# Patient Record
Sex: FEMALE | Race: WHITE
[De-identification: names, ages, dates, MRNs, and addresses within clinical notes are randomized per-mention and may not be internally consistent; named-entity substitution may affect disease eponyms.]

---

## 2019-11-30 ENCOUNTER — HOSPITAL ENCOUNTER (EMERGENCY)
Dept: HOSPITAL 4 - SED | Age: 24
Discharge: HOME | End: 2019-11-30
Payer: MEDICAID

## 2019-11-30 VITALS — SYSTOLIC BLOOD PRESSURE: 124 MMHG

## 2019-11-30 VITALS — BODY MASS INDEX: 31.89 KG/M2 | HEIGHT: 63 IN | WEIGHT: 180 LBS

## 2019-11-30 VITALS — SYSTOLIC BLOOD PRESSURE: 125 MMHG
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
## 2019-11-30 DIAGNOSIS — T49.8X5A: ICD-10-CM

## 2019-11-30 DIAGNOSIS — R22.0: Primary | ICD-10-CM

## 2021-01-06 ENCOUNTER — HOSPITAL ENCOUNTER (INPATIENT)
Dept: HOSPITAL 4 - SED | Age: 26
LOS: 1 days | Discharge: HOME | DRG: 566 | End: 2021-01-07
Payer: MEDICAID

## 2021-01-06 VITALS — BODY MASS INDEX: 32.43 KG/M2 | HEIGHT: 63 IN | WEIGHT: 183 LBS

## 2021-01-06 VITALS — SYSTOLIC BLOOD PRESSURE: 137 MMHG

## 2021-01-06 DIAGNOSIS — Z20.822: ICD-10-CM

## 2021-01-06 DIAGNOSIS — O00.90: Primary | ICD-10-CM

## 2021-01-07 VITALS — SYSTOLIC BLOOD PRESSURE: 98 MMHG

## 2021-01-07 LAB
ANION GAP SERPL CALCULATED.3IONS-SCNC: 12 MMOL/L (ref 5–15)
APPEARANCE UR: (no result)
BACTERIA URNS QL MICRO: (no result) /HPF
BASOPHILS # BLD AUTO: 0.1 K/UL (ref 0–0.2)
BASOPHILS NFR BLD AUTO: 1 % (ref 0–2)
BILIRUB UR QL STRIP: NEGATIVE
BUN SERPL-MCNC: 10 MG/DL (ref 8–21)
CALCIUM SERPL-MCNC: 9.2 MG/DL (ref 8.4–11)
CHLORIDE SERPL-SCNC: 101 MMOL/L (ref 98–107)
COLOR UR: YELLOW
CREAT SERPL-MCNC: 0.77 MG/DL (ref 0.55–1.3)
EOSINOPHIL # BLD AUTO: 0.1 K/UL (ref 0–0.4)
EOSINOPHIL NFR BLD AUTO: 0.9 % (ref 0–4)
ERYTHROCYTE [DISTWIDTH] IN BLOOD BY AUTOMATED COUNT: 13.4 % (ref 9–15)
GFR SERPL CREATININE-BSD FRML MDRD: 117 ML/MIN (ref 90–?)
GLUCOSE SERPL-MCNC: 89 MG/DL (ref 70–99)
GLUCOSE UR STRIP-MCNC: NEGATIVE MG/DL
HCG SERPL-ACNC: 2110 MIU/ML (ref 0–6)
HCT VFR BLD AUTO: 34.4 % (ref 36–48)
HGB BLD-MCNC: 11.6 G/DL (ref 12–16)
HGB UR QL STRIP: (no result)
INR PPP: 1 (ref 0.8–1.2)
KETONES UR STRIP-MCNC: NEGATIVE MG/DL
LEUKOCYTE ESTERASE UR QL STRIP: NEGATIVE
LYMPHOCYTES # BLD AUTO: 2.2 K/UL (ref 1–5.5)
LYMPHOCYTES NFR BLD AUTO: 21.6 % (ref 20.5–51.5)
MCH RBC QN AUTO: 28 PG (ref 27–31)
MCHC RBC AUTO-ENTMCNC: 34 % (ref 32–36)
MCV RBC AUTO: 84 FL (ref 79–98)
MONOCYTES # BLD MANUAL: 0.9 K/UL (ref 0–1)
MONOCYTES # BLD MANUAL: 9.3 % (ref 1.7–9.3)
NEUTROPHILS # BLD AUTO: 6.8 K/UL (ref 1.8–7.7)
NEUTROPHILS NFR BLD AUTO: 67.2 % (ref 40–70)
NITRITE UR QL STRIP: NEGATIVE
PH UR STRIP: 5.5 [PH] (ref 5–8)
PLATELET # BLD AUTO: 366 K/UL (ref 130–430)
POTASSIUM SERPL-SCNC: 3.7 MMOL/L (ref 3.5–5.1)
PROT UR QL STRIP: (no result)
PROTHROMBIN TIME: 9.8 SECS (ref 9.5–12.5)
RBC # BLD AUTO: 4.1 MIL/UL (ref 4.2–6.2)
SODIUM SERPLBLD-SCNC: 137 MMOL/L (ref 136–145)
SP GR UR STRIP: >=1.03 (ref 1–1.03)
UROBILINOGEN UR STRIP-MCNC: 0.2 MG/DL (ref 0.2–1)
WBC # BLD AUTO: 10.1 K/UL (ref 4.8–10.8)
WBC #/AREA URNS HPF: (no result) /HPF (ref 0–3)

## 2021-01-07 RX ADMIN — SODIUM CHLORIDE, SODIUM LACTATE, POTASSIUM CHLORIDE, AND CALCIUM CHLORIDE SCH MLS/HR: 600; 310; 30; 20 INJECTION, SOLUTION INTRAVENOUS at 07:45

## 2021-01-07 RX ADMIN — SODIUM CHLORIDE, SODIUM LACTATE, POTASSIUM CHLORIDE, AND CALCIUM CHLORIDE SCH MLS/HR: 600; 310; 30; 20 INJECTION, SOLUTION INTRAVENOUS at 16:15

## 2021-01-27 ENCOUNTER — HOSPITAL ENCOUNTER (OUTPATIENT)
Dept: HOSPITAL 4 - SED | Age: 26
Setting detail: OBSERVATION
Discharge: HOME | End: 2021-01-27
Payer: MEDICAID

## 2021-01-27 VITALS — SYSTOLIC BLOOD PRESSURE: 113 MMHG

## 2021-01-27 VITALS — BODY MASS INDEX: 32.43 KG/M2 | WEIGHT: 183 LBS | HEIGHT: 63 IN

## 2021-01-27 VITALS — SYSTOLIC BLOOD PRESSURE: 117 MMHG

## 2021-01-27 DIAGNOSIS — K66.1: ICD-10-CM

## 2021-01-27 DIAGNOSIS — Z87.59: ICD-10-CM

## 2021-01-27 DIAGNOSIS — O00.101: Primary | ICD-10-CM

## 2021-01-27 DIAGNOSIS — Z20.828: ICD-10-CM

## 2021-01-27 LAB
ALBUMIN SERPL BCP-MCNC: 3.8 G/DL (ref 3.4–4.8)
ALT SERPL W P-5'-P-CCNC: 23 U/L (ref 12–78)
ANION GAP SERPL CALCULATED.3IONS-SCNC: 7 MMOL/L (ref 5–15)
AST SERPL W P-5'-P-CCNC: 15 U/L (ref 10–37)
BASOPHILS # BLD AUTO: 0.1 K/UL (ref 0–0.2)
BASOPHILS NFR BLD AUTO: 1 % (ref 0–2)
BILIRUB SERPL-MCNC: 0.3 MG/DL (ref 0–1)
BUN SERPL-MCNC: 8 MG/DL (ref 8–21)
CALCIUM SERPL-MCNC: 9.1 MG/DL (ref 8.4–11)
CHLORIDE SERPL-SCNC: 101 MMOL/L (ref 98–107)
CREAT SERPL-MCNC: 0.71 MG/DL (ref 0.55–1.3)
EOSINOPHIL # BLD AUTO: 0.1 K/UL (ref 0–0.4)
EOSINOPHIL NFR BLD AUTO: 1.5 % (ref 0–4)
ERYTHROCYTE [DISTWIDTH] IN BLOOD BY AUTOMATED COUNT: 13.6 % (ref 9–15)
GFR SERPL CREATININE-BSD FRML MDRD: 129 ML/MIN (ref 90–?)
GLUCOSE SERPL-MCNC: 83 MG/DL (ref 70–99)
HCG SERPL-ACNC: 463 MIU/ML (ref 0–6)
HCT VFR BLD AUTO: 37 % (ref 36–48)
HGB BLD-MCNC: 12.4 G/DL (ref 12–16)
INR PPP: 1 (ref 0.8–1.2)
LYMPHOCYTES # BLD AUTO: 1.6 K/UL (ref 1–5.5)
LYMPHOCYTES NFR BLD AUTO: 27.1 % (ref 20.5–51.5)
MCH RBC QN AUTO: 28 PG (ref 27–31)
MCHC RBC AUTO-ENTMCNC: 33 % (ref 32–36)
MCV RBC AUTO: 83 FL (ref 79–98)
MONOCYTES # BLD MANUAL: 0.7 K/UL (ref 0–1)
MONOCYTES # BLD MANUAL: 12.1 % (ref 1.7–9.3)
NEUTROPHILS # BLD AUTO: 3.4 K/UL (ref 1.8–7.7)
NEUTROPHILS NFR BLD AUTO: 58.3 % (ref 40–70)
PLATELET # BLD AUTO: 366 K/UL (ref 130–430)
POTASSIUM SERPL-SCNC: 4.3 MMOL/L (ref 3.5–5.1)
PROTHROMBIN TIME: 10.2 SECS (ref 9.5–12.5)
RBC # BLD AUTO: 4.45 MIL/UL (ref 4.2–6.2)
SODIUM SERPLBLD-SCNC: 135 MMOL/L (ref 136–145)
WBC # BLD AUTO: 5.9 K/UL (ref 4.8–10.8)

## 2021-01-27 PROCEDURE — 85025 COMPLETE CBC W/AUTO DIFF WBC: CPT

## 2021-01-27 PROCEDURE — 36415 COLL VENOUS BLD VENIPUNCTURE: CPT

## 2021-01-27 PROCEDURE — 86886 COOMBS TEST INDIRECT TITER: CPT

## 2021-01-27 PROCEDURE — 84702 CHORIONIC GONADOTROPIN TEST: CPT

## 2021-01-27 PROCEDURE — 96374 THER/PROPH/DIAG INJ IV PUSH: CPT

## 2021-01-27 PROCEDURE — 86901 BLOOD TYPING SEROLOGIC RH(D): CPT

## 2021-01-27 PROCEDURE — 87426 SARSCOV CORONAVIRUS AG IA: CPT

## 2021-01-27 PROCEDURE — 59151 TREAT ECTOPIC PREGNANCY: CPT

## 2021-01-27 PROCEDURE — 96361 HYDRATE IV INFUSION ADD-ON: CPT

## 2021-01-27 PROCEDURE — 88305 TISSUE EXAM BY PATHOLOGIST: CPT

## 2021-01-27 PROCEDURE — G0378 HOSPITAL OBSERVATION PER HR: HCPCS

## 2021-01-27 PROCEDURE — 96375 TX/PRO/DX INJ NEW DRUG ADDON: CPT

## 2021-01-27 PROCEDURE — 85610 PROTHROMBIN TIME: CPT

## 2021-01-27 PROCEDURE — C1727 CATH, BAL TIS DIS, NON-VAS: HCPCS

## 2021-01-27 PROCEDURE — 80053 COMPREHEN METABOLIC PANEL: CPT

## 2021-01-27 PROCEDURE — 99285 EMERGENCY DEPT VISIT HI MDM: CPT

## 2021-01-27 PROCEDURE — 86900 BLOOD TYPING SEROLOGIC ABO: CPT

## 2021-03-18 ENCOUNTER — HOSPITAL ENCOUNTER (EMERGENCY)
Dept: HOSPITAL 4 - SED | Age: 26
Discharge: HOME | End: 2021-03-18
Payer: SELF-PAY

## 2021-03-18 VITALS — SYSTOLIC BLOOD PRESSURE: 120 MMHG | BODY MASS INDEX: 31.89 KG/M2 | HEIGHT: 63 IN | WEIGHT: 180 LBS

## 2021-03-18 VITALS — SYSTOLIC BLOOD PRESSURE: 115 MMHG

## 2021-03-18 DIAGNOSIS — A53.9: Primary | ICD-10-CM

## 2021-03-18 PROCEDURE — 99283 EMERGENCY DEPT VISIT LOW MDM: CPT

## 2021-03-18 PROCEDURE — 96372 THER/PROPH/DIAG INJ SC/IM: CPT

## 2021-03-18 NOTE — NUR
Patient given written and verbal discharge instructions and verbalizes 
understanding.  ER MD discussed with patient the results and treatment 
provided. Patient in stable condition. ID arm band removed.

. Patient educated on pain management and to follow up with PMD. Pain Scale 
0/10

Opportunity for questions provided and answered. Medication side effect fact 
sheet provided.

## 2021-03-25 ENCOUNTER — HOSPITAL ENCOUNTER (EMERGENCY)
Dept: HOSPITAL 4 - SED | Age: 26
Discharge: HOME | End: 2021-03-25
Payer: MEDICAID

## 2021-03-25 VITALS — SYSTOLIC BLOOD PRESSURE: 136 MMHG | WEIGHT: 180 LBS | BODY MASS INDEX: 31.89 KG/M2 | HEIGHT: 63 IN

## 2021-03-25 VITALS — SYSTOLIC BLOOD PRESSURE: 130 MMHG

## 2021-03-25 DIAGNOSIS — A53.9: ICD-10-CM

## 2021-03-25 DIAGNOSIS — R68.89: Primary | ICD-10-CM

## 2021-03-25 PROCEDURE — 96372 THER/PROPH/DIAG INJ SC/IM: CPT

## 2021-03-25 PROCEDURE — 99283 EMERGENCY DEPT VISIT LOW MDM: CPT

## 2021-04-01 ENCOUNTER — HOSPITAL ENCOUNTER (EMERGENCY)
Dept: HOSPITAL 4 - SED | Age: 26
Discharge: HOME | End: 2021-04-01
Payer: MEDICAID

## 2021-04-01 VITALS — BODY MASS INDEX: 31.89 KG/M2 | HEIGHT: 63 IN | WEIGHT: 180 LBS

## 2021-04-01 VITALS — SYSTOLIC BLOOD PRESSURE: 137 MMHG

## 2021-04-01 DIAGNOSIS — A53.9: Primary | ICD-10-CM

## 2021-04-01 PROCEDURE — 96372 THER/PROPH/DIAG INJ SC/IM: CPT

## 2021-04-01 PROCEDURE — 99283 EMERGENCY DEPT VISIT LOW MDM: CPT

## 2021-08-22 ENCOUNTER — HOSPITAL ENCOUNTER (EMERGENCY)
Dept: HOSPITAL 4 - SED | Age: 26
LOS: 1 days | Discharge: HOME | End: 2021-08-23
Payer: MEDICAID

## 2021-08-22 VITALS — HEIGHT: 63 IN | WEIGHT: 182 LBS | BODY MASS INDEX: 32.25 KG/M2

## 2021-08-22 VITALS — SYSTOLIC BLOOD PRESSURE: 125 MMHG

## 2021-08-22 DIAGNOSIS — O20.0: Primary | ICD-10-CM

## 2021-08-22 DIAGNOSIS — Z3A.01: ICD-10-CM

## 2021-08-22 LAB
APPEARANCE UR: (no result)
BACTERIA URNS QL MICRO: (no result) /HPF
BASOPHILS # BLD AUTO: 0.1 K/UL (ref 0–0.2)
BASOPHILS NFR BLD AUTO: 0.8 % (ref 0–2)
BILIRUB UR QL STRIP: NEGATIVE
COLOR UR: YELLOW
EOSINOPHIL # BLD AUTO: 0.1 K/UL (ref 0–0.4)
EOSINOPHIL NFR BLD AUTO: 1.3 % (ref 0–4)
ERYTHROCYTE [DISTWIDTH] IN BLOOD BY AUTOMATED COUNT: 14 % (ref 9–15)
GLUCOSE UR STRIP-MCNC: (no result) MG/DL
HCT VFR BLD AUTO: 38 % (ref 36–48)
HGB BLD-MCNC: 12.9 G/DL (ref 12–16)
HGB UR QL STRIP: (no result)
KETONES UR STRIP-MCNC: NEGATIVE MG/DL
LEUKOCYTE ESTERASE UR QL STRIP: NEGATIVE
LYMPHOCYTES # BLD AUTO: 2 K/UL (ref 1–5.5)
LYMPHOCYTES NFR BLD AUTO: 21.8 % (ref 20.5–51.5)
MCH RBC QN AUTO: 29 PG (ref 27–31)
MCHC RBC AUTO-ENTMCNC: 34 % (ref 32–36)
MCV RBC AUTO: 85 FL (ref 79–98)
MONOCYTES # BLD MANUAL: 0.9 K/UL (ref 0–1)
MONOCYTES # BLD MANUAL: 10.1 % (ref 1.7–9.3)
NEUTROPHILS # BLD AUTO: 6.2 K/UL (ref 1.8–7.7)
NEUTROPHILS NFR BLD AUTO: 66 % (ref 40–70)
NITRITE UR QL STRIP: NEGATIVE
PH UR STRIP: 6 [PH] (ref 5–8)
PLATELET # BLD AUTO: 344 K/UL (ref 130–430)
PROT UR QL STRIP: NEGATIVE
RBC # BLD AUTO: 4.5 MIL/UL (ref 4.2–6.2)
RBC #/AREA URNS HPF: (no result) /HPF (ref 0–3)
SP GR UR STRIP: 1.01 (ref 1–1.03)
UROBILINOGEN UR STRIP-MCNC: 0.2 MG/DL (ref 0.2–1)
WBC # BLD AUTO: 9.3 K/UL (ref 4.8–10.8)
WBC #/AREA URNS HPF: (no result) /HPF (ref 0–3)

## 2021-08-22 NOTE — NUR
Patient came in to the emergency room c/o vaginal bleeding, mild, bright red in 
color. Patient reports 5 weeks gestation, , 1 AB, 1 miscarriage. Patient 
reports lower abdominal pain for two days. Pt denies fever, chills, or vomiting 
.Pt with nausea. Pt denies chest pain or SOB. Pt describes dull constant 3/10 
suprapubic pain without radiation. Pt without treatment for pain. Pt denies 
dysuria. Patient breathing easy, not in any distress. Patient VS within normal 
limits.

## 2021-08-23 VITALS — SYSTOLIC BLOOD PRESSURE: 122 MMHG

## 2021-08-23 NOTE — NUR
Patient given written and verbal discharge instructions and verbalizes 
understanding. ER MD discussed with patient the results and treatment provided. 
Patient in stable condition. ID arm band removed. Rx of Zofran  sent to 
preferred pharmacy. Patient educated on pain management and to follow up with 
PMD. Pain Scale 3/10.Opportunity for questions provided and answered.

## 2021-09-01 ENCOUNTER — HOSPITAL ENCOUNTER (EMERGENCY)
Dept: HOSPITAL 4 - SED | Age: 26
Discharge: HOME | End: 2021-09-01
Payer: MEDICAID

## 2021-09-01 VITALS — HEIGHT: 63 IN | WEIGHT: 182 LBS | BODY MASS INDEX: 32.25 KG/M2

## 2021-09-01 VITALS — SYSTOLIC BLOOD PRESSURE: 140 MMHG

## 2021-09-01 DIAGNOSIS — Z3A.01: ICD-10-CM

## 2021-09-01 DIAGNOSIS — Z79.899: ICD-10-CM

## 2021-09-01 DIAGNOSIS — O20.9: Primary | ICD-10-CM

## 2021-09-01 LAB
BASOPHILS # BLD AUTO: 0.1 K/UL (ref 0–0.2)
BASOPHILS NFR BLD AUTO: 0.8 % (ref 0–2)
EOSINOPHIL # BLD AUTO: 0.1 K/UL (ref 0–0.4)
EOSINOPHIL NFR BLD AUTO: 0.7 % (ref 0–4)
ERYTHROCYTE [DISTWIDTH] IN BLOOD BY AUTOMATED COUNT: 14.2 % (ref 9–15)
HCT VFR BLD AUTO: 38.1 % (ref 36–48)
HGB BLD-MCNC: 13 G/DL (ref 12–16)
INR PPP: 1 (ref 0.8–1.2)
LYMPHOCYTES # BLD AUTO: 2.3 K/UL (ref 1–5.5)
LYMPHOCYTES NFR BLD AUTO: 15.8 % (ref 20.5–51.5)
MCH RBC QN AUTO: 29 PG (ref 27–31)
MCHC RBC AUTO-ENTMCNC: 34 % (ref 32–36)
MCV RBC AUTO: 84 FL (ref 79–98)
MONOCYTES # BLD MANUAL: 1 K/UL (ref 0–1)
MONOCYTES # BLD MANUAL: 6.7 % (ref 1.7–9.3)
NEUTROPHILS # BLD AUTO: 11 K/UL (ref 1.8–7.7)
NEUTROPHILS NFR BLD AUTO: 76 % (ref 40–70)
PLATELET # BLD AUTO: 360 K/UL (ref 130–430)
PROTHROMBIN TIME: 10.3 SECS (ref 9.5–12.5)
RBC # BLD AUTO: 4.53 MIL/UL (ref 4.2–6.2)
WBC # BLD AUTO: 14.5 K/UL (ref 4.8–10.8)

## 2021-09-01 NOTE — NUR
Pt walked in to ER with c/o vaginal bleeding x1 day, reports being approx  
weeks pregnant. Denies any abdominal pain, n/v, no fevers. V/S stable, no acute 
distress noted.

## 2021-09-02 ENCOUNTER — HOSPITAL ENCOUNTER (EMERGENCY)
Dept: HOSPITAL 4 - SED | Age: 26
Discharge: HOME | End: 2021-09-02
Payer: MEDICAID

## 2021-09-02 VITALS — WEIGHT: 182 LBS | HEIGHT: 63 IN | SYSTOLIC BLOOD PRESSURE: 111 MMHG | BODY MASS INDEX: 32.25 KG/M2

## 2021-09-02 VITALS — SYSTOLIC BLOOD PRESSURE: 109 MMHG

## 2021-09-02 DIAGNOSIS — Z3A.01: ICD-10-CM

## 2021-09-02 DIAGNOSIS — Z79.899: ICD-10-CM

## 2021-09-02 DIAGNOSIS — O26.851: Primary | ICD-10-CM

## 2021-09-02 LAB
ALBUMIN SERPL BCP-MCNC: 3.3 G/DL (ref 3.4–4.8)
ALT SERPL W P-5'-P-CCNC: 18 U/L (ref 12–78)
ANION GAP SERPL CALCULATED.3IONS-SCNC: 6 MMOL/L (ref 5–15)
AST SERPL W P-5'-P-CCNC: 12 U/L (ref 10–37)
BASOPHILS # BLD AUTO: 0.1 K/UL (ref 0–0.2)
BASOPHILS NFR BLD AUTO: 0.8 % (ref 0–2)
BILIRUB SERPL-MCNC: 0.2 MG/DL (ref 0–1)
BUN SERPL-MCNC: 10 MG/DL (ref 8–21)
CALCIUM SERPL-MCNC: 8.7 MG/DL (ref 8.4–11)
CHLORIDE SERPL-SCNC: 103 MMOL/L (ref 98–107)
CREAT SERPL-MCNC: 0.75 MG/DL (ref 0.55–1.3)
EOSINOPHIL # BLD AUTO: 0.2 K/UL (ref 0–0.4)
EOSINOPHIL NFR BLD AUTO: 1.7 % (ref 0–4)
ERYTHROCYTE [DISTWIDTH] IN BLOOD BY AUTOMATED COUNT: 14.5 % (ref 9–15)
GFR SERPL CREATININE-BSD FRML MDRD: 120 ML/MIN (ref 90–?)
GLUCOSE SERPL-MCNC: 128 MG/DL (ref 70–99)
HCG SERPL-ACNC: 378 MIU/ML (ref 0–6)
HCT VFR BLD AUTO: 35.2 % (ref 36–48)
HGB BLD-MCNC: 12.1 G/DL (ref 12–16)
LYMPHOCYTES # BLD AUTO: 2.1 K/UL (ref 1–5.5)
LYMPHOCYTES NFR BLD AUTO: 23.2 % (ref 20.5–51.5)
MCH RBC QN AUTO: 29 PG (ref 27–31)
MCHC RBC AUTO-ENTMCNC: 34 % (ref 32–36)
MCV RBC AUTO: 85 FL (ref 79–98)
MONOCYTES # BLD MANUAL: 1.1 K/UL (ref 0–1)
MONOCYTES # BLD MANUAL: 12 % (ref 1.7–9.3)
NEUTROPHILS # BLD AUTO: 5.7 K/UL (ref 1.8–7.7)
NEUTROPHILS NFR BLD AUTO: 62.3 % (ref 40–70)
PLATELET # BLD AUTO: 338 K/UL (ref 130–430)
POTASSIUM SERPL-SCNC: 4.2 MMOL/L (ref 3.5–5.1)
RBC # BLD AUTO: 4.14 MIL/UL (ref 4.2–6.2)
SODIUM SERPLBLD-SCNC: 137 MMOL/L (ref 136–145)
WBC # BLD AUTO: 9.1 K/UL (ref 4.8–10.8)

## 2021-09-02 PROCEDURE — 85025 COMPLETE CBC W/AUTO DIFF WBC: CPT

## 2021-09-02 PROCEDURE — 99283 EMERGENCY DEPT VISIT LOW MDM: CPT

## 2021-09-02 PROCEDURE — 80053 COMPREHEN METABOLIC PANEL: CPT

## 2021-09-02 PROCEDURE — 84702 CHORIONIC GONADOTROPIN TEST: CPT

## 2021-09-02 PROCEDURE — 36415 COLL VENOUS BLD VENIPUNCTURE: CPT

## 2021-09-02 PROCEDURE — 96372 THER/PROPH/DIAG INJ SC/IM: CPT

## 2021-09-02 NOTE — NUR
Assumed total care of patient. Patient AAO x4 from home reports she was told by 
Dr. Anders to come here to receive methotrexate shot. Patient c/o left lower 
quadrant abdominal pain and vaginal bleeding x1 week. Patient reports she 
recently became pregnant and her hCG lab values have been fluctuating. Patient 
is being treated for an ectopic pregnancy. Patient breathing even and 
unlabored, no signs of acute distress noted. Will continue to monitor.

## 2021-09-08 ENCOUNTER — HOSPITAL ENCOUNTER (EMERGENCY)
Dept: HOSPITAL 4 - SED | Age: 26
Discharge: HOME | End: 2021-09-08
Payer: MEDICAID

## 2021-09-08 VITALS — HEIGHT: 63 IN | WEIGHT: 186 LBS | BODY MASS INDEX: 32.96 KG/M2

## 2021-09-08 VITALS — SYSTOLIC BLOOD PRESSURE: 138 MMHG

## 2021-09-08 DIAGNOSIS — Z20.822: ICD-10-CM

## 2021-09-08 DIAGNOSIS — Z79.899: ICD-10-CM

## 2021-09-08 DIAGNOSIS — B34.9: Primary | ICD-10-CM

## 2021-09-08 PROCEDURE — U0003 INFECTIOUS AGENT DETECTION BY NUCLEIC ACID (DNA OR RNA); SEVERE ACUTE RESPIRATORY SYNDROME CORONAVIRUS 2 (SARS-COV-2) (CORONAVIRUS DISEASE [COVID-19]), AMPLIFIED PROBE TECHNIQUE, MAKING USE OF HIGH THROUGHPUT TECHNOLOGIES AS DESCRIBED BY CMS-2020-01-R: HCPCS

## 2021-09-08 PROCEDURE — 99283 EMERGENCY DEPT VISIT LOW MDM: CPT

## 2021-09-08 PROCEDURE — C9803 HOPD COVID-19 SPEC COLLECT: HCPCS

## 2021-09-08 NOTE — NUR
Patient given written and verbal discharge instructions and verbalizes 
understanding.  ER MD discussed with patient the results and treatment 
provided. Patient in stable condition. ID arm band removed.

No Rx  given. Patient educated on pain management and to follow up with PMD. 
Pain Scale  0/10.

Opportunity for questions provided and answered. Medication side effect fact 
sheet provided.

## 2021-09-08 NOTE — NUR
Pt brought by self,A&Ox4, pt presents to ER with sore throat and congestion, 
skin pink and warm, cap refill <3, VSS, respirations even and unlabored.

## 2021-09-13 ENCOUNTER — HOSPITAL ENCOUNTER (EMERGENCY)
Dept: HOSPITAL 4 - SED | Age: 26
Discharge: HOME | End: 2021-09-13
Payer: MEDICAID

## 2021-09-13 VITALS — SYSTOLIC BLOOD PRESSURE: 131 MMHG

## 2021-09-13 VITALS — HEIGHT: 63 IN | WEIGHT: 183 LBS | BODY MASS INDEX: 32.43 KG/M2

## 2021-09-13 DIAGNOSIS — O00.80: Primary | ICD-10-CM

## 2021-09-13 DIAGNOSIS — R10.32: ICD-10-CM

## 2021-09-13 DIAGNOSIS — Z79.899: ICD-10-CM

## 2021-09-13 LAB
BASOPHILS # BLD AUTO: 0.1 K/UL (ref 0–0.2)
BASOPHILS NFR BLD AUTO: 0.7 % (ref 0–2)
EOSINOPHIL # BLD AUTO: 0.1 K/UL (ref 0–0.4)
EOSINOPHIL NFR BLD AUTO: 1 % (ref 0–4)
ERYTHROCYTE [DISTWIDTH] IN BLOOD BY AUTOMATED COUNT: 14.2 % (ref 9–15)
HCT VFR BLD AUTO: 35.1 % (ref 36–48)
HGB BLD-MCNC: 11.8 G/DL (ref 12–16)
LYMPHOCYTES # BLD AUTO: 2.1 K/UL (ref 1–5.5)
LYMPHOCYTES NFR BLD AUTO: 17.4 % (ref 20.5–51.5)
MCH RBC QN AUTO: 29 PG (ref 27–31)
MCHC RBC AUTO-ENTMCNC: 34 % (ref 32–36)
MCV RBC AUTO: 85 FL (ref 79–98)
MONOCYTES # BLD MANUAL: 1.1 K/UL (ref 0–1)
MONOCYTES # BLD MANUAL: 9 % (ref 1.7–9.3)
NEUTROPHILS # BLD AUTO: 8.7 K/UL (ref 1.8–7.7)
NEUTROPHILS NFR BLD AUTO: 71.9 % (ref 40–70)
PLATELET # BLD AUTO: 421 K/UL (ref 130–430)
RBC # BLD AUTO: 4.12 MIL/UL (ref 4.2–6.2)
WBC # BLD AUTO: 12.1 K/UL (ref 4.8–10.8)

## 2021-09-13 NOTE — NUR
Pt walked in to ER with c/o LLQ abdominal pain, 6/10 x2 days. Reports h/o 
ectopic pregnancy with fallopian tube removal. Denies n/v or any fevers at this 
time. V/S stable, no acute distress noted.

## 2021-11-13 ENCOUNTER — HOSPITAL ENCOUNTER (EMERGENCY)
Dept: HOSPITAL 4 - SED | Age: 26
Discharge: HOME | End: 2021-11-13
Payer: MEDICAID

## 2021-11-13 VITALS — BODY MASS INDEX: 33.29 KG/M2 | HEIGHT: 64 IN | WEIGHT: 195 LBS

## 2021-11-13 VITALS — SYSTOLIC BLOOD PRESSURE: 124 MMHG

## 2021-11-13 VITALS — SYSTOLIC BLOOD PRESSURE: 117 MMHG

## 2021-11-13 DIAGNOSIS — Z79.899: ICD-10-CM

## 2021-11-13 DIAGNOSIS — Z20.822: ICD-10-CM

## 2021-11-13 DIAGNOSIS — J98.01: Primary | ICD-10-CM

## 2022-09-07 ENCOUNTER — HOSPITAL ENCOUNTER (EMERGENCY)
Dept: HOSPITAL 4 - SED | Age: 27
Discharge: LEFT BEFORE DISCHARGE | End: 2022-09-07
Payer: MEDICAID

## 2022-09-07 VITALS — WEIGHT: 200 LBS | BODY MASS INDEX: 35.44 KG/M2 | HEIGHT: 63 IN

## 2022-09-07 VITALS — SYSTOLIC BLOOD PRESSURE: 126 MMHG

## 2022-09-07 DIAGNOSIS — M54.50: Primary | ICD-10-CM

## 2022-09-07 DIAGNOSIS — Z53.21: ICD-10-CM

## 2022-09-07 DIAGNOSIS — R10.2: ICD-10-CM

## 2022-09-08 ENCOUNTER — HOSPITAL ENCOUNTER (EMERGENCY)
Dept: HOSPITAL 4 - SED | Age: 27
Discharge: HOME | End: 2022-09-08
Payer: MEDICAID

## 2022-09-08 VITALS — BODY MASS INDEX: 36.32 KG/M2 | WEIGHT: 205 LBS | HEIGHT: 63 IN

## 2022-09-08 VITALS — SYSTOLIC BLOOD PRESSURE: 127 MMHG
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
## 2022-09-08 DIAGNOSIS — Z79.899: ICD-10-CM
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
## 2022-09-08 DIAGNOSIS — S39.012A: Primary | ICD-10-CM

## 2022-09-08 LAB
APPEARANCE UR: CLEAR
BILIRUB UR QL STRIP: NEGATIVE
COLOR UR: YELLOW
GLUCOSE UR STRIP-MCNC: NEGATIVE MG/DL
HGB UR QL STRIP: NEGATIVE
KETONES UR STRIP-MCNC: NEGATIVE MG/DL
LEUKOCYTE ESTERASE UR QL STRIP: NEGATIVE
NITRITE UR QL STRIP: NEGATIVE
PH UR STRIP: 6.5 [PH] (ref 5–8)
PROT UR QL STRIP: NEGATIVE
SP GR UR STRIP: 1.01 (ref 1–1.03)
UROBILINOGEN UR STRIP-MCNC: 0.2 MG/DL (ref 0.2–1)

## 2022-09-08 NOTE — NUR
DC PT HOME AAOX4, NO SOB NOTED AND NOT IN ANY DISTRESS, DC INSTRUCTION, 
PRESCRITION AND WORK NOTE WERE GIVEMN TO PT AND TO HER MOTHER. ALSO INSTRUCTED 
TO F/U WITH HER PCP. SHE VERBALIZED UNDERSTANDING.

## 2022-09-08 NOTE — NUR
Pt taken on a w/c to room 2. Pt c/o lower back pain that radiates to both of 
her legs. Pt denies trauma and sts it is a sudden onset of pain that she is 
having trouble walking. Pt repots 9/10 pain level. Assisted to bed and provided 
comfort. Pending ER MD yoon. 



Pmh: Denies

## 2022-09-10 ENCOUNTER — HOSPITAL ENCOUNTER (EMERGENCY)
Dept: HOSPITAL 4 - SED | Age: 27
LOS: 1 days | Discharge: HOME | End: 2022-09-11
Payer: MEDICAID

## 2022-09-10 VITALS — HEIGHT: 63 IN | BODY MASS INDEX: 35.44 KG/M2 | WEIGHT: 200 LBS

## 2022-09-10 VITALS — SYSTOLIC BLOOD PRESSURE: 133 MMHG

## 2022-09-10 DIAGNOSIS — Z79.899: ICD-10-CM

## 2022-09-10 DIAGNOSIS — M79.662: ICD-10-CM

## 2022-09-10 DIAGNOSIS — M54.50: Primary | ICD-10-CM

## 2022-09-10 LAB
APPEARANCE UR: (no result)
BILIRUB UR QL STRIP: NEGATIVE
COLOR UR: YELLOW
GLUCOSE UR STRIP-MCNC: NEGATIVE MG/DL
HGB UR QL STRIP: NEGATIVE
KETONES UR STRIP-MCNC: NEGATIVE MG/DL
LEUKOCYTE ESTERASE UR QL STRIP: NEGATIVE
NITRITE UR QL STRIP: NEGATIVE
PH UR STRIP: 6 [PH] (ref 5–8)
PROT UR QL STRIP: NEGATIVE
SP GR UR STRIP: 1.02 (ref 1–1.03)
UROBILINOGEN UR STRIP-MCNC: 0.2 MG/DL (ref 0.2–1)

## 2022-09-10 PROCEDURE — 76376 3D RENDER W/INTRP POSTPROCES: CPT

## 2022-09-10 PROCEDURE — 81025 URINE PREGNANCY TEST: CPT

## 2022-09-10 PROCEDURE — 72131 CT LUMBAR SPINE W/O DYE: CPT

## 2022-09-10 PROCEDURE — 96372 THER/PROPH/DIAG INJ SC/IM: CPT

## 2022-09-10 PROCEDURE — 81003 URINALYSIS AUTO W/O SCOPE: CPT

## 2022-09-10 PROCEDURE — 99284 EMERGENCY DEPT VISIT MOD MDM: CPT

## 2022-09-10 NOTE — NUR
First contact with this patient at this time. 

Patient presents to ED from home with c/o left sided lower back pain x2days. 
Patient reports pain level 8/10 at this time. Patient accompained by mother who 
is at bedside at this time. Patient A/Ox4, VSS, ambulatory, resp even and 
unlabored. Nad noted at this time.

## 2022-09-11 VITALS — SYSTOLIC BLOOD PRESSURE: 135 MMHG

## 2022-09-11 NOTE — NUR
Patient given written and verbal discharge instructions and verbalizes 
understanding.  ER MD discussed with patient the results and treatment 
provided. Patient in stable condition. ID arm band removed. 

Rx of [place] given. Patient educated on pain management and to follow up with 
PMD. Pain Scale 3/10.

Opportunity for questions provided and answered. Medication side effect fact 
sheet provided. Patient A/Ox4, VSS, ambulatory, resp even and unlabored. 
Patient in stable condition and accompanied by mother upon discharge.

## 2022-09-11 NOTE — NUR
Patient resting comfortably in bed with side rails raised. Patient's mother at 
bedside. Nad noted at this time.

## 2023-02-16 ENCOUNTER — HOSPITAL ENCOUNTER (EMERGENCY)
Dept: HOSPITAL 4 - SED | Age: 28
Discharge: HOME | End: 2023-02-16
Payer: MEDICAID

## 2023-02-16 ENCOUNTER — HOSPITAL ENCOUNTER (EMERGENCY)
Dept: HOSPITAL 4 - SED | Age: 28
LOS: 1 days | Discharge: HOME | End: 2023-02-17
Payer: MEDICAID

## 2023-02-16 VITALS — BODY MASS INDEX: 35.85 KG/M2 | WEIGHT: 210 LBS | HEIGHT: 64 IN

## 2023-02-16 VITALS — BODY MASS INDEX: 34.15 KG/M2 | WEIGHT: 200 LBS | HEIGHT: 64 IN

## 2023-02-16 VITALS — SYSTOLIC BLOOD PRESSURE: 128 MMHG

## 2023-02-16 VITALS — SYSTOLIC BLOOD PRESSURE: 130 MMHG

## 2023-02-16 DIAGNOSIS — J10.1: Primary | ICD-10-CM

## 2023-02-16 DIAGNOSIS — R05.9: ICD-10-CM

## 2023-02-16 DIAGNOSIS — Z20.822: ICD-10-CM

## 2023-02-16 DIAGNOSIS — R06.02: ICD-10-CM

## 2023-02-16 DIAGNOSIS — Z79.899: ICD-10-CM

## 2023-02-16 DIAGNOSIS — J06.9: ICD-10-CM

## 2023-02-16 DIAGNOSIS — R09.81: ICD-10-CM

## 2023-02-16 DIAGNOSIS — J40: Primary | ICD-10-CM

## 2023-02-17 VITALS — SYSTOLIC BLOOD PRESSURE: 125 MMHG

## 2023-02-17 NOTE — NUR
Patient given written and verbal discharge instructions and verbalizes 
understanding.  ER MD discussed with patient the results and treatment 
provided. Patient in stable condition. ID arm band removed. 

no Rx of  given. Patient educated on pain management and to follow up with PMD. 
Pain Scale 0/10].

Opportunity for questions provided and answered. Medication side effect fact 
sheet provided.

## 2023-08-02 NOTE — NUR
MEDICATED AS ORDERED, PT CALM, ALERT, TOLERATED WELL Pt cleared for admission by ED provider.  VSS. Pt in no acute distress.  Pt belongings with pt  Monitor applied    Pt up to floor in stable condition w/this RN and transport.

## 2023-11-25 ENCOUNTER — HOSPITAL ENCOUNTER (EMERGENCY)
Dept: HOSPITAL 4 - SED | Age: 28
Discharge: HOME | End: 2023-11-25
Payer: MEDICAID

## 2023-11-25 VITALS
RESPIRATION RATE: 17 BRPM | SYSTOLIC BLOOD PRESSURE: 122 MMHG | OXYGEN SATURATION: 99 % | TEMPERATURE: 98 F | HEART RATE: 80 BPM

## 2023-11-25 VITALS
RESPIRATION RATE: 18 BRPM | SYSTOLIC BLOOD PRESSURE: 129 MMHG | TEMPERATURE: 96.6 F | OXYGEN SATURATION: 98 % | HEART RATE: 82 BPM

## 2023-11-25 VITALS — WEIGHT: 182 LBS | HEIGHT: 64 IN | BODY MASS INDEX: 31.07 KG/M2

## 2023-11-25 DIAGNOSIS — R73.9: ICD-10-CM

## 2023-11-25 DIAGNOSIS — R07.89: ICD-10-CM

## 2023-11-25 DIAGNOSIS — Z79.899: ICD-10-CM

## 2023-11-25 DIAGNOSIS — R00.2: Primary | ICD-10-CM

## 2023-11-25 LAB
ALBUMIN SERPL BCP-MCNC: 3.7 G/DL (ref 3.4–4.8)
ALT SERPL W P-5'-P-CCNC: 100 U/L (ref 12–78)
ANION GAP SERPL CALCULATED.3IONS-SCNC: 10 MMOL/L (ref 5–15)
APPEARANCE UR: CLEAR
AST SERPL W P-5'-P-CCNC: 50 U/L (ref 10–37)
BASOPHILS # BLD AUTO: 0.1 K/UL (ref 0–0.2)
BASOPHILS NFR BLD AUTO: 0.9 % (ref 0–2)
BILIRUB DIRECT SERPL-MCNC: 0.1 MG/DL (ref 0–0.3)
BILIRUB SERPL-MCNC: 0.3 MG/DL (ref 0–1)
BILIRUB UR QL STRIP: NEGATIVE
BUN SERPL-MCNC: 14 MG/DL (ref 8–21)
CALCIUM SERPL-MCNC: 9.8 MG/DL (ref 8.4–11)
CHLORIDE SERPL-SCNC: 99 MMOL/L (ref 98–107)
CO2 SERPLBLD-SCNC: 26 MMOL/L (ref 23–29)
COLOR UR: YELLOW
CREAT SERPL-MCNC: 0.66 MG/DL (ref 0.55–1.3)
EOSINOPHIL # BLD AUTO: 0.2 K/UL (ref 0–0.4)
EOSINOPHIL NFR BLD AUTO: 2 % (ref 0–4)
ERYTHROCYTE [DISTWIDTH] IN BLOOD BY AUTOMATED COUNT: 13.4 % (ref 9–15)
GFR SERPL CREATININE-BSD FRML MDRD: 137 ML/MIN (ref 90–?)
GLUCOSE SERPL-MCNC: 210 MG/DL (ref 74–106)
GLUCOSE UR STRIP-MCNC: (no result) MG/DL
HCT VFR BLD AUTO: 41.2 % (ref 36–48)
HGB BLD-MCNC: 13.7 G/DL (ref 12–16)
HGB UR QL STRIP: NEGATIVE
KETONES UR STRIP-MCNC: (no result) MG/DL
LEUKOCYTE ESTERASE UR QL STRIP: NEGATIVE
LYMPHOCYTES # BLD AUTO: 2.8 K/UL (ref 1–5.5)
LYMPHOCYTES NFR BLD AUTO: 27.5 % (ref 20.5–51.5)
MCH RBC QN AUTO: 28 PG (ref 27–31)
MCHC RBC AUTO-ENTMCNC: 33 % (ref 32–36)
MCV RBC AUTO: 85 FL (ref 79–98)
MONOCYTES # BLD MANUAL: 1 K/UL (ref 0–1)
MONOCYTES # BLD MANUAL: 9.8 % (ref 1.7–9.3)
NEUTROPHILS # BLD AUTO: 6 K/UL (ref 1.8–7.7)
NEUTROPHILS NFR BLD AUTO: 59.8 % (ref 40–70)
NITRITE UR QL STRIP: NEGATIVE
PH UR STRIP: 5.5 [PH] (ref 5–8)
PLATELET # BLD AUTO: 279 K/UL (ref 130–430)
POTASSIUM SERPL-SCNC: 4 MMOL/L (ref 3.5–5.1)
PROT SERPL-MCNC: 8.4 G/DL (ref 6.4–8.3)
PROT UR QL STRIP: NEGATIVE
RBC # BLD AUTO: 4.85 MIL/UL (ref 4.2–6.2)
SODIUM SERPLBLD-SCNC: 135 MMOL/L (ref 136–145)
SP GR UR STRIP: >=1.03 (ref 1–1.03)
UROBILINOGEN UR STRIP-MCNC: 0.2 MG/DL (ref 0.2–1)
WBC # BLD AUTO: 10.1 K/UL (ref 4.8–10.8)